# Patient Record
Sex: MALE | Race: BLACK OR AFRICAN AMERICAN | NOT HISPANIC OR LATINO | Employment: FULL TIME | URBAN - METROPOLITAN AREA
[De-identification: names, ages, dates, MRNs, and addresses within clinical notes are randomized per-mention and may not be internally consistent; named-entity substitution may affect disease eponyms.]

---

## 2021-05-25 ENCOUNTER — CONSULT (OUTPATIENT)
Dept: ONCOLOGY | Facility: CLINIC | Age: 31
End: 2021-05-25

## 2021-05-25 ENCOUNTER — LAB (OUTPATIENT)
Dept: LAB | Facility: HOSPITAL | Age: 31
End: 2021-05-25

## 2021-05-25 VITALS
BODY MASS INDEX: 36.45 KG/M2 | RESPIRATION RATE: 20 BRPM | DIASTOLIC BLOOD PRESSURE: 81 MMHG | HEIGHT: 78 IN | TEMPERATURE: 96.9 F | OXYGEN SATURATION: 98 % | WEIGHT: 315 LBS | HEART RATE: 80 BPM | SYSTOLIC BLOOD PRESSURE: 165 MMHG

## 2021-05-25 DIAGNOSIS — R71.8 LOW MEAN CORPUSCULAR VOLUME (MCV): Primary | ICD-10-CM

## 2021-05-25 DIAGNOSIS — R71.8 ELEVATED RED BLOOD CELL COUNT: ICD-10-CM

## 2021-05-25 DIAGNOSIS — R71.8 LOW MEAN CORPUSCULAR VOLUME (MCV): ICD-10-CM

## 2021-05-25 LAB
BASOPHILS # BLD AUTO: 0.03 10*3/MM3 (ref 0–0.2)
BASOPHILS NFR BLD AUTO: 0.7 % (ref 0–1.5)
DEPRECATED RDW RBC AUTO: 43.8 FL (ref 37–54)
EOSINOPHIL # BLD AUTO: 0.14 10*3/MM3 (ref 0–0.4)
EOSINOPHIL NFR BLD AUTO: 3.1 % (ref 0.3–6.2)
ERYTHROCYTE [DISTWIDTH] IN BLOOD BY AUTOMATED COUNT: 16.7 % (ref 12.3–15.4)
FERRITIN SERPL-MCNC: 165.4 NG/ML (ref 30–400)
HCT VFR BLD AUTO: 54 % (ref 37.5–51)
HGB BLD-MCNC: 16.1 G/DL (ref 13–17.7)
IMM GRANULOCYTES # BLD AUTO: 0.01 10*3/MM3 (ref 0–0.05)
IMM GRANULOCYTES NFR BLD AUTO: 0.2 % (ref 0–0.5)
IRON 24H UR-MRATE: 52 MCG/DL (ref 59–158)
IRON SATN MFR SERPL: 14 % (ref 20–50)
LYMPHOCYTES # BLD AUTO: 1.61 10*3/MM3 (ref 0.7–3.1)
LYMPHOCYTES NFR BLD AUTO: 35.9 % (ref 19.6–45.3)
MCH RBC QN AUTO: 24 PG (ref 26.6–33)
MCHC RBC AUTO-ENTMCNC: 29.8 G/DL (ref 31.5–35.7)
MCV RBC AUTO: 80.4 FL (ref 79–97)
MONOCYTES # BLD AUTO: 0.42 10*3/MM3 (ref 0.1–0.9)
MONOCYTES NFR BLD AUTO: 9.4 % (ref 5–12)
NEUTROPHILS NFR BLD AUTO: 2.27 10*3/MM3 (ref 1.7–7)
NEUTROPHILS NFR BLD AUTO: 50.7 % (ref 42.7–76)
NRBC BLD AUTO-RTO: 0 /100 WBC (ref 0–0.2)
PLATELET # BLD AUTO: 317 10*3/MM3 (ref 140–450)
PMV BLD AUTO: 9.5 FL (ref 6–12)
RBC # BLD AUTO: 6.72 10*6/MM3 (ref 4.14–5.8)
TIBC SERPL-MCNC: 380 MCG/DL (ref 298–536)
TRANSFERRIN SERPL-MCNC: 255 MG/DL (ref 200–360)
WBC # BLD AUTO: 4.48 10*3/MM3 (ref 3.4–10.8)

## 2021-05-25 PROCEDURE — 83540 ASSAY OF IRON: CPT

## 2021-05-25 PROCEDURE — 83020 HEMOGLOBIN ELECTROPHORESIS: CPT | Performed by: INTERNAL MEDICINE

## 2021-05-25 PROCEDURE — 85060 BLOOD SMEAR INTERPRETATION: CPT

## 2021-05-25 PROCEDURE — 82746 ASSAY OF FOLIC ACID SERUM: CPT

## 2021-05-25 PROCEDURE — 82607 VITAMIN B-12: CPT

## 2021-05-25 PROCEDURE — 85025 COMPLETE CBC W/AUTO DIFF WBC: CPT

## 2021-05-25 PROCEDURE — 36415 COLL VENOUS BLD VENIPUNCTURE: CPT | Performed by: INTERNAL MEDICINE

## 2021-05-25 PROCEDURE — 82728 ASSAY OF FERRITIN: CPT

## 2021-05-25 PROCEDURE — 84466 ASSAY OF TRANSFERRIN: CPT

## 2021-05-25 PROCEDURE — 99204 OFFICE O/P NEW MOD 45 MIN: CPT | Performed by: INTERNAL MEDICINE

## 2021-05-25 PROCEDURE — 82306 VITAMIN D 25 HYDROXY: CPT

## 2021-05-25 RX ORDER — LISINOPRIL AND HYDROCHLOROTHIAZIDE 20; 12.5 MG/1; MG/1
TABLET ORAL
COMMUNITY
Start: 2021-02-18

## 2021-05-25 RX ORDER — METFORMIN HYDROCHLORIDE 500 MG/1
TABLET, EXTENDED RELEASE ORAL
COMMUNITY
Start: 2021-02-24

## 2021-05-25 RX ORDER — ROSUVASTATIN CALCIUM 40 MG/1
TABLET, COATED ORAL
COMMUNITY
Start: 2021-02-24

## 2021-05-25 NOTE — PROGRESS NOTES
New Patient Office Visit      Date: 2021     Patient Name: Isaac Day  MRN: [id number]  : 1990  Referring Physician: Kofi Gutierrez    Chief Complaint: Establish care for elevated RBC count    History of Present Illness: Isaac Day is a pleasant 30 y.o. male past medical history of hypertension, type 2 diabetes, hyperlipidemia, IRVIN who presents today for evaluation of elevated RBC count. The patient was recently seen by their PCP who checked a CBC which was notable for an RBC count of 6.6.  Hemoglobin was 16.1.  MCV was noted to be 79.  Patient states that he was diagnosed with obstructive sleep apnea but has not had an official sleep study nor is he on any CPAP machine.  He denies any smoking or exogenous testosterone use.  Denies any family history of bleeding or blood disorders.  Denies any personal history of easy bruising or bleeding.  He denies any unexplained weight loss, fevers, night sweats, chills.  Denies any family history of thalassemia or sickle cell.  Does note that his mother has iron deficiency and has had repeat iron replacement in the past.    Oncology History:    Oncology/Hematology History    No history exists.       Subjective      Review of Systems:     Constitutional: Negative for fevers, chills, or weight loss  Eyes: Negative for blurred vision or discharge         Ear/Nose/Throat: Negative for difficulty swallowing, sore throat, LAD                                                       Respiratory: Negative for cough, SOA, wheezing                                                                                        Cardiovascular: Negative for chest pain or palpitations                                                                  Gastrointestinal: Negative for nausea, vomiting or diarrhea                                                                     Genitourinary: Negative for dysuria or hematuria                                                       "                                     Musculoskeletal: Negative for any joint pains or muscle aches                                                                        Neurologic: Negative for any weakness, headaches, dizziness                                                                         Hematologic: Negative for any easy bleeding or bruising                                                                                   Psychiatric: Negative for anxiety or depression                             Past Medical History: History reviewed. No pertinent past medical history.    Past Surgical History: History reviewed. No pertinent surgical history.    Family History: History reviewed. No pertinent family history.    Social History:   Social History     Socioeconomic History   • Marital status: Single     Spouse name: Not on file   • Number of children: Not on file   • Years of education: Not on file   • Highest education level: Not on file   Tobacco Use   • Smoking status: Never Smoker   • Smokeless tobacco: Never Used       Medications:     Current Outpatient Medications:   •  lisinopril-hydrochlorothiazide (PRINZIDE,ZESTORETIC) 20-12.5 MG per tablet, TAKE 2 (TWO) TABLETS BY MOUTH DAILY (AT THE SAME TIME) IN THE AM FOR HIGH BLOOD PRESSURE, Disp: , Rfl:   •  metFORMIN ER (GLUCOPHAGE-XR) 500 MG 24 hr tablet, TAKE 1 (ONE) TABLET BY MOUTH BEFORE SUPPER, Disp: , Rfl:   •  rosuvastatin (CRESTOR) 40 MG tablet, TAKE 1 (ONE) TABLET BY MOUTH AT BEDTIME FOR HIGH CHOLESTEROL, Disp: , Rfl:     Allergies:   No Known Allergies    Objective     Physical Exam:  Vital Signs:   Vitals:    05/25/21 1319   BP: 165/81   Pulse: 80   Resp: 20   Temp: 96.9 °F (36.1 °C)   SpO2: 98%   Weight: (!) 196 kg (433 lb)   Height: 203.2 cm (80\")   PainSc: 0-No pain     Pain Score    05/25/21 1319   PainSc: 0-No pain     ECOG Performance Status: 0 - Asymptomatic    Constitutional: NAD, ECOG 0  Eyes: PERRLA, scleral anicteric  ENT: No LAD, no " thyromegaly  Respiratory: CTAB, no wheezing, rales, rhonchi  Cardiovascular: RRR, no murmurs, pulses 2+ bilaterally  Abdomen: soft, NT/ND, no HSM  Musculoskeletal: strength 5/5 bilaterally, no c/c/e  Neurologic: A&O x 3, CN II-XII intact grossly  Psych: mood and affect congruent, no SI or HI    Results Review:   No visits with results within 2 Week(s) from this visit.   Latest known visit with results is:   No results found for any previous visit.       No results found.    Assessment / Plan      Assessment/Plan:   1. Low mean corpuscular volume (MCV) (Primary)/2. Elevated red blood cell count  -RBC count 6.6.  Hemoglobin 16.1, MCV 79 noted on outside labs  -Given discrepancy of RBC count with hemoglobin level along with decreased MCV, will order labs as below to rule out other secondary causes including vitamin deficiency, hemoglobinopathy, iron deficiency  -Low concern for an acute hematologic malignancy at this time    -     CBC & Differential; Future  -     Ferritin; Future  -     Iron Profile; Future  -     Peripheral Blood Smear; Future  -     Hemoglobinopathy Fractionation Cascade  -     Vitamin B12; Future  -     Folate; Future  -     Vitamin D 25 Hydroxy; Future    Follow Up:   Follow-up in 3 weeks     Gautam Knox MD  Hematology and Oncology     Please note that portions of this note may have been completed with a voice recognition program. Efforts were made to edit the dictations, but occasionally words are mistranscribed.

## 2021-05-26 LAB
25(OH)D3 SERPL-MCNC: 6.8 NG/ML
CYTOLOGIST CVX/VAG CYTO: NORMAL
FOLATE SERPL-MCNC: 8.84 NG/ML (ref 4.78–24.2)
HGB A MFR BLD ELPH: 97.6 % (ref 96.4–98.8)
HGB A2 MFR BLD ELPH: 2.4 % (ref 1.8–3.2)
HGB F MFR BLD ELPH: 0 % (ref 0–2)
HGB FRACT BLD-IMP: NORMAL
HGB S MFR BLD ELPH: 0 %
PATH INTERP BLD-IMP: NORMAL
VIT B12 BLD-MCNC: 689 PG/ML (ref 211–946)

## 2022-06-09 ENCOUNTER — HOSPITAL ENCOUNTER (OUTPATIENT)
Dept: GENERAL RADIOLOGY | Facility: HOSPITAL | Age: 32
Discharge: HOME | End: 2022-06-09
Admitting: INTERNAL MEDICINE

## 2022-06-09 ENCOUNTER — TRANSCRIBE ORDERS (OUTPATIENT)
Dept: ADMINISTRATIVE | Facility: HOSPITAL | Age: 32
End: 2022-06-09

## 2022-06-09 DIAGNOSIS — M54.50 LOW BACK PAIN, UNSPECIFIED BACK PAIN LATERALITY, UNSPECIFIED CHRONICITY, UNSPECIFIED WHETHER SCIATICA PRESENT: ICD-10-CM

## 2022-06-09 DIAGNOSIS — M25.512 ACUTE PAIN OF LEFT SHOULDER: ICD-10-CM

## 2022-06-09 DIAGNOSIS — M54.12 CERVICAL RADICULAR PAIN: Primary | ICD-10-CM

## 2022-06-09 DIAGNOSIS — M54.12 CERVICAL RADICULAR PAIN: ICD-10-CM

## 2022-06-09 DIAGNOSIS — M25.511 ACUTE PAIN OF RIGHT SHOULDER: ICD-10-CM

## 2022-06-09 PROCEDURE — 72100 X-RAY EXAM L-S SPINE 2/3 VWS: CPT

## 2022-06-09 PROCEDURE — 72040 X-RAY EXAM NECK SPINE 2-3 VW: CPT

## 2022-06-09 PROCEDURE — 73030 X-RAY EXAM OF SHOULDER: CPT
